# Patient Record
Sex: FEMALE | Race: WHITE
[De-identification: names, ages, dates, MRNs, and addresses within clinical notes are randomized per-mention and may not be internally consistent; named-entity substitution may affect disease eponyms.]

---

## 2020-02-07 ENCOUNTER — HOSPITAL ENCOUNTER (OUTPATIENT)
Dept: HOSPITAL 25 - CHICATH | Age: 50
Setting detail: OBSERVATION
LOS: 1 days | Discharge: HOME | End: 2020-02-08
Attending: HOSPITALIST | Admitting: NURSE PRACTITIONER
Payer: COMMERCIAL

## 2020-02-07 DIAGNOSIS — R10.2: ICD-10-CM

## 2020-02-07 DIAGNOSIS — D25.9: Primary | ICD-10-CM

## 2020-02-07 DIAGNOSIS — N92.0: ICD-10-CM

## 2020-02-07 DIAGNOSIS — G43.909: ICD-10-CM

## 2020-02-07 LAB
ANION GAP SERPL CALC-SCNC: 7 MMOL/L (ref 2–11)
APTT PPP: 30.1 SECONDS (ref 26–38)
BASOPHILS # BLD AUTO: 0 10^3/UL (ref 0–0.2)
BUN SERPL-MCNC: 12 MG/DL (ref 6–24)
BUN/CREAT SERPL: 13.6 (ref 8–20)
CALCIUM SERPL-MCNC: 9.2 MG/DL (ref 8.6–10.3)
CHLORIDE SERPL-SCNC: 105 MMOL/L (ref 101–111)
EOSINOPHIL # BLD AUTO: 0 10^3/UL (ref 0–0.6)
GLUCOSE SERPL-MCNC: 88 MG/DL (ref 70–100)
HCG SERPL QL: < 0.6 MIU/ML
HCO3 SERPL-SCNC: 26 MMOL/L (ref 22–32)
HCT VFR BLD AUTO: 41 % (ref 35–47)
HGB BLD-MCNC: 13.8 G/DL (ref 12–16)
INR PPP/BLD: 0.97 (ref 0.82–1.09)
LYMPHOCYTES # BLD AUTO: 1.2 10^3/UL (ref 1–4.8)
MCH RBC QN AUTO: 33 PG (ref 27–31)
MCHC RBC AUTO-ENTMCNC: 34 G/DL (ref 31–36)
MCV RBC AUTO: 96 FL (ref 80–97)
MONOCYTES # BLD AUTO: 0.8 10^3/UL (ref 0–0.8)
NEUTROPHILS # BLD AUTO: 5.1 10^3/UL (ref 1.5–7.7)
NRBC # BLD AUTO: 0 10^3/UL
NRBC BLD QL AUTO: 0
PLATELET # BLD AUTO: 309 10^3/UL (ref 150–450)
POTASSIUM SERPL-SCNC: 3.6 MMOL/L (ref 3.5–5)
RBC # BLD AUTO: 4.24 10^6 /UL (ref 3.7–4.87)
SODIUM SERPL-SCNC: 138 MMOL/L (ref 135–145)
WBC # BLD AUTO: 7.1 10^3/UL (ref 3.5–10.8)

## 2020-02-07 PROCEDURE — 85730 THROMBOPLASTIN TIME PARTIAL: CPT

## 2020-02-07 PROCEDURE — 76937 US GUIDE VASCULAR ACCESS: CPT

## 2020-02-07 PROCEDURE — 36415 COLL VENOUS BLD VENIPUNCTURE: CPT

## 2020-02-07 PROCEDURE — 80048 BASIC METABOLIC PNL TOTAL CA: CPT

## 2020-02-07 PROCEDURE — 85025 COMPLETE CBC W/AUTO DIFF WBC: CPT

## 2020-02-07 PROCEDURE — G0378 HOSPITAL OBSERVATION PER HR: HCPCS

## 2020-02-07 PROCEDURE — 75736 ARTERY X-RAYS PELVIS: CPT

## 2020-02-07 PROCEDURE — 84702 CHORIONIC GONADOTROPIN TEST: CPT

## 2020-02-07 PROCEDURE — C1884 EMBOLIZATION PROTECT SYST: HCPCS

## 2020-02-07 PROCEDURE — 99156 MOD SED OTH PHYS/QHP 5/>YRS: CPT

## 2020-02-07 PROCEDURE — C1894 INTRO/SHEATH, NON-LASER: HCPCS

## 2020-02-07 PROCEDURE — 85610 PROTHROMBIN TIME: CPT

## 2020-02-07 PROCEDURE — C1769 GUIDE WIRE: HCPCS

## 2020-02-07 PROCEDURE — C1887 CATHETER, GUIDING: HCPCS

## 2020-02-07 PROCEDURE — 96374 THER/PROPH/DIAG INJ IV PUSH: CPT

## 2020-02-07 PROCEDURE — 96361 HYDRATE IV INFUSION ADD-ON: CPT

## 2020-02-07 PROCEDURE — 37243 VASC EMBOLIZE/OCCLUDE ORGAN: CPT

## 2020-02-07 PROCEDURE — 96375 TX/PRO/DX INJ NEW DRUG ADDON: CPT

## 2020-02-07 PROCEDURE — 99157 MOD SED OTHER PHYS/QHP EA: CPT

## 2020-02-07 PROCEDURE — 96376 TX/PRO/DX INJ SAME DRUG ADON: CPT

## 2020-02-07 RX ADMIN — KETOROLAC TROMETHAMINE SCH MG: 15 INJECTION, SOLUTION INTRAMUSCULAR; INTRAVENOUS at 21:51

## 2020-02-07 RX ADMIN — ONDANSETRON SCH MG: 2 INJECTION INTRAMUSCULAR; INTRAVENOUS at 21:51

## 2020-02-07 NOTE — HP
CC:  TULIO Napier *

 

MEDICINE HISTORY AND PHYSICAL:

 

DATE OF ADMISSION:  02/07/20

 

PROVIDER:  Nubia Kim NP

 

ATTENDING PHYSICIAN:  Dr. Lida Chavarria * (dictated by Nubia Kim NP).

 

PRIMARY CARE PROVIDER:  TULIO Napier

 

INTERVENTIONAL RADIOLOGIST:  Christian Mack MD

 

CHIEF COMPLAINT:  Status post uterine fibroid embolization.

 

HISTORY OF PRESENT ILLNESS:  This is a 49-year-old female who was electively 
admitted today for a uterine fibroid embolization, status post procedure.  She 
reports that her pain is currently well controlled.  She is somewhat sleepy, 
but denies any acute distress at this time.  Reports that she was feeling well 
prior to the procedure.  Her  is at bedside and he also denies any acute 
concerns. For further history, I direct you to the H and P provided by Dr. Mack dated 01/22/20.

 

PAST MEDICAL HISTORY:  Includes:

1.  Uterine fibroids.

2.  Migraines.

 

HOME MEDICATIONS:  Tranexamic acid 1300 mg t.i.d. p.r.n.

 

ALLERGIES:  No known allergies.

 

FAMILY HISTORY:  She has a father with diabetes, mother with history of stroke 
and COPD.

 

SOCIAL HISTORY:  She denies smoking.  Denies alcohol use and history of drug 
use. She lives with her  and children.  She works as a .  
She lists her , Juan C Mario as her surrogate decision maker and 
healthcare proxy in the event of emergency.

 

REVIEW OF SYSTEMS:  A 12-point review of systems was completed.  The patient is 
somewhat sedated, but denies any acute concerns other than chronic abdominal 
pain, constipation, diarrhea which is secondary to IBS and issues with her 
menstrual cycle and uterine fibroids.

 

                               PHYSICAL EXAMINATION

 

GENERAL:  This is a 49-year-old female, appears stated age, lying in the PACU 
stretcher, in no acute distress.

 

VITAL SIGNS:  Temp 97.7, heart rate 50, respiratory rate 14, blood pressure 119/
64, O2 sat 96% on room air.

 

HEENT:  Head is atraumatic and normocephalic.  Pupils are equal and round. 
Extraocular movements are intact.  Sclerae anicteric.  Oral mucosa is moist.

 

NECK:  Supple.  No lymphadenopathy appreciated.

 

LUNGS:  Clear to auscultation anteriorly.

 

CARDIAC:  Regular rate and rhythm.  Normal S1, S2 heart sounds.  No murmur 
appreciated.  Pedal pulses are 2+ and symmetric at dorsalis pedis and posterior 
tibialis sites.

 

ABDOMEN:  Soft, positive bowel sounds.

 

MUSCULOSKELETAL:  No clubbing or cyanosis of the lower extremities bilaterally. 
There is active range of motion in the upper and lower extremities at this 
point.

 

NEURO:  Sleepy, but arousable.  Speech is clear.   are equal.

 

 ASSESSMENT AND PLAN:  This is a 49-year-old female who is status post uterine 
fibroid embolization procedure done by Dr. Mack.  She is postop day 0.

 

1.  Status post uterine fibroid embolization.  Plan of care as per Dr. Mack. 
Continue with pain management and antibiotics as ordered.

2.  History of migraines, supportive care and treat as needed.

3.  DVT prophylaxis.  SCDs and encourage ambulation when able.

4.  Code status:  Full code.

 

TIME SPENT:  Approximately 60 minutes was spent on this admission with greater 
than half of that time was spent face-to-face with the patient and in chart 
review.

 

Plan of care was also reviewed with my attending, Dr. Chavarria, who is in agreement.

 

 ____________________________________ 

NUBIA KIM NP

 

[id number] #: [id number]

MARI

## 2020-02-08 VITALS — SYSTOLIC BLOOD PRESSURE: 117 MMHG | DIASTOLIC BLOOD PRESSURE: 64 MMHG

## 2020-02-08 RX ADMIN — ONDANSETRON SCH MG: 2 INJECTION INTRAMUSCULAR; INTRAVENOUS at 03:57

## 2020-02-08 RX ADMIN — KETOROLAC TROMETHAMINE SCH MG: 15 INJECTION, SOLUTION INTRAMUSCULAR; INTRAVENOUS at 03:56

## 2020-02-08 NOTE — DS
CC:  Dr. Christian Mack; TULIO Diamond *

 

DISCHARGE SUMMARY:

 

DATE OF ADMISSION:  02/07/20

 

DATE OF DISCHARGE:  02/08/20

 

PRIMARY CARE PROVIDER:  TULIO Diamond

 

ATTENDING PHYSICIAN WHILE IN THE HOSPITAL:  Dr. Zach Mclain * (dictated by 
АЛЕКСАНДР Talley).

 

INTERVENTIONAL RADIOLOGIST:  Dr. Christian Mack.

 

PRIMARY DIAGNOSIS:  Status post uterine fibroid embolization.

 

SECONDARY DIAGNOSES:

1.  Uterine fibroids complicated by heavy menses.

2.  Migraines.

 

HISTORY OF PRESENT ILLNESS/HOSPITAL COURSE:  Sosa Mario is a 49-year-old white 
female with past medical history significant for uterine fibroids and migraines
, who presents to the emergency department for elective uterine fibroid 
arterial embolization with Dr. Mack on 02/07/20.  The patient was admitted to 
the hospital for pain control and symptomatic nausea control as well after the 
procedure.  The patient was admitted with PCA and overall was transferred to 
oral medications and her pain was well under control.  By day of discharge, Dr. Mack evaluated the patient and agreed that she was ready for discharge.  The 
patient tolerated the procedure well and had no nausea or vomiting by day of 
discharge as well as good pain control other than when she is moving.

 

PHYSICAL EXAM ON DAY OF DISCHARGE:  General:  Well-developed, well-nourished 
white female, lying in the hospital bed appearing comfortable, in no acute 
distress. Eyes:  PERRL and sclerae anicteric.  Lungs:  Respirations are equal 
and symmetrical.  Cardio:  Regular rate and rhythm without murmurs, rubs or 
gallops. Abdomen:  Normoactive bowel sounds x4 quadrants.  Abdomen is soft, 
nontender, and nondistended.  Extremities:  No clubbing, cyanosis, or edema.  
Neuro:  The patient is alert and oriented x3.

 

DISCHARGE PLAN:  Diet:  Regular unrestricted diet.

 

Activity:  The patient may return to her regular activity as tolerated.

 

The patient is to follow up with her primary care provider in approximately a 
week. She is scheduled to follow up with Interventional Radiology by phone 
appointment as well as in the office.  She was advised to return to the 
emergency department if she is experiencing pain not controlled by oral 
medications, bleeding from the surgical site, fever, swelling at the puncture 
site, or heavy bleeding that persist outside of her menstrual cycle.  Dr. Mack advises that after 3 days of Toradol, she should be starting ibuprofen 
400 mg every 6 hours.  ______ Toradol to be not p.r.n. and after that ibuprofen 
400 mg p.o. q.6 hours scheduled (to start in 3 days)  ______ Toradol scheduled 
every 6 hours x3 days.

 

DISCHARGE MEDICATIONS:

1.  Scopolamine 1.5 mg transdermal q.72 hours.

2.  Zofran 4 mg p.o. q.6 hours p.r.n. nausea and vomiting.

3.  Toradol 10 mg p.o. q.6 hours p.r.n. pain.

4.  Hydrocodone/acetaminophen 5/325 mg 1 to 2 tabs p.o. q.6 hours p.r.n. severe 
pain.

5.  Colace 100 mg p.o. b.i.d. p.r.n. constipation.

6.  Tylenol 650 mg p.o. q.4 hours p.r.n. pain.

 

DISPOSITION:  Home.

 

CONDITION ON DISCHARGE:  Stable.

 

TIME SPENT:  Approximately 40 minutes was spent on this discharge, 
approximately half this time was spent at bedside evaluating the patient and 
discussing the plan of care.

 

____________________________________ АЛЕКСАНДР TALLEY

 

[id number] #: [id number]

MTDD

## 2022-06-18 NOTE — PN
Progress Note





- Progress Note


Date of Service: 02/07/20


SOAP: 


Date of Service: 2/7/20





Subjective:





Pain rated at 5/10. 


Denies nausea or emesis. 





Objective:





 Selected Entries











  02/07/20





  17:45


 


Heart Rate 50


 


Respiratory 13





Rate 


 


Blood Pressure 119/64





(mmHg) 


 


Blood Pressure 90





Mean 


 


O2 Sat by Pulse 96





Oximetry 








Sleeping quietly, but arousable to voice. 


NAD, AAO x 3


Abdomen is soft, minimally tender to palpation. No rebound or guarding. 


Right groin is soft, nontender.


2+ right leg pulses.


Right leg is neuromuscular intact. 





Assessment:





49 YOF status post Uterine Fibroid Arterial Embolization with pain and nausea 

adequately controlled. 





Plan:


1. Admit to [facility] for routine post UFE Interventional Radiology pain & nausea 

control. 


2. Patient reminded to use PCA. 


3. Will see patient tomorrow AM. 


4. Head of bed can be raised up to 30 degrees during bedrest.
Progress Note





- Progress Note


Date of Service: 02/08/20


SOAP: 


Subjective:





Pain currently controlled. Previously rated 3/10. 


Denies nausea or emesis. 


Has eaten breakfast and voided. 


Ambulates independently. 


Wants to go home. 





Objective:





 Selected Entries











  02/08/20





  08:08


 


Temperature 99.1 F


 


Temperature Oral





Source 


 


Pulse Rate 59


 


Respiratory 14





Rate 


 


Blood Pressure 119/46





(mmHg) 


 


Blood Pressure 70





Mean 


 


O2 Sat by Pulse 98





Oximetry 


 


Patient on Room Yes





Air 








NAD, AAO x 3


Abd and pelvis are soft, nontender. No rebound or guarding. 


Right groin is soft, nontender


2+ pulses in right leg


Right leg is neuromuscular intact grossly





Assessment:





49 YOF POD #1 Uterine Fibroid Arterial Embolization with pain and nausea well 

controlled. 





Plan:


1. DC to home. 


2. Routine Interventional Radiology protocol will include RN clinic call Monday

, 2/10/20 and Friday, 2/15/20 and 6 week office visit. 


3. Discharge medications will be as follows (these [place] [facility] 

[place]):


* Tordaol 10 mg PO every 6 hours x 3 days (dispense # 15), one refill. 


* After 3 days of Toradol start Ibuprofen 400 mg PO every 6 hours OR Naprosyn 

225 mg PO every 8 hours for 3 more days. 


* *****DO NOT COMBINE TORADOL AND IBUPROFEN/NAPROSYN*****


* Norco 5/325 take 1 or 2 tablets by mouth every 6 hours as needed for 

breakthrough pain x 5 days (dispense # 40). 


* Zofran 4 mg PO every 6 hours x 7 days (dispense #40), one refill. 


* Scopolamine 1.5 mg TD to mastoid process. On Monday, 2/11/20 at 900AM, remove 

current patch and replace with new one. Wear x 3 days.
Calm
Triggering events leading to humiliation, shame, and/or despair (e.g., Loss of relationship, financial or health status) (real or anticipated)/Current or pending social isolation